# Patient Record
Sex: FEMALE | Race: ASIAN
[De-identification: names, ages, dates, MRNs, and addresses within clinical notes are randomized per-mention and may not be internally consistent; named-entity substitution may affect disease eponyms.]

---

## 2018-12-18 ENCOUNTER — HOSPITAL ENCOUNTER (INPATIENT)
Dept: HOSPITAL 72 - EMR | Age: 68
LOS: 3 days | Discharge: HOME | DRG: 640 | End: 2018-12-21
Payer: MEDICARE

## 2018-12-18 VITALS — HEIGHT: 61 IN | BODY MASS INDEX: 33.61 KG/M2 | WEIGHT: 178 LBS

## 2018-12-18 VITALS — SYSTOLIC BLOOD PRESSURE: 114 MMHG | DIASTOLIC BLOOD PRESSURE: 85 MMHG

## 2018-12-18 VITALS — DIASTOLIC BLOOD PRESSURE: 65 MMHG | SYSTOLIC BLOOD PRESSURE: 132 MMHG

## 2018-12-18 VITALS — DIASTOLIC BLOOD PRESSURE: 59 MMHG | SYSTOLIC BLOOD PRESSURE: 169 MMHG

## 2018-12-18 VITALS — DIASTOLIC BLOOD PRESSURE: 62 MMHG | SYSTOLIC BLOOD PRESSURE: 140 MMHG

## 2018-12-18 VITALS — SYSTOLIC BLOOD PRESSURE: 137 MMHG | DIASTOLIC BLOOD PRESSURE: 56 MMHG

## 2018-12-18 VITALS — DIASTOLIC BLOOD PRESSURE: 68 MMHG | SYSTOLIC BLOOD PRESSURE: 168 MMHG

## 2018-12-18 VITALS — DIASTOLIC BLOOD PRESSURE: 73 MMHG | SYSTOLIC BLOOD PRESSURE: 184 MMHG

## 2018-12-18 VITALS — SYSTOLIC BLOOD PRESSURE: 159 MMHG | DIASTOLIC BLOOD PRESSURE: 76 MMHG

## 2018-12-18 DIAGNOSIS — E11.649: ICD-10-CM

## 2018-12-18 DIAGNOSIS — F32.9: ICD-10-CM

## 2018-12-18 DIAGNOSIS — E87.1: Primary | ICD-10-CM

## 2018-12-18 DIAGNOSIS — Z79.84: ICD-10-CM

## 2018-12-18 DIAGNOSIS — I13.0: ICD-10-CM

## 2018-12-18 DIAGNOSIS — F41.9: ICD-10-CM

## 2018-12-18 DIAGNOSIS — E87.6: ICD-10-CM

## 2018-12-18 DIAGNOSIS — D61.818: ICD-10-CM

## 2018-12-18 DIAGNOSIS — G47.00: ICD-10-CM

## 2018-12-18 DIAGNOSIS — N18.9: ICD-10-CM

## 2018-12-18 DIAGNOSIS — I50.33: ICD-10-CM

## 2018-12-18 DIAGNOSIS — E87.8: ICD-10-CM

## 2018-12-18 DIAGNOSIS — D63.8: ICD-10-CM

## 2018-12-18 DIAGNOSIS — E11.21: ICD-10-CM

## 2018-12-18 DIAGNOSIS — E87.2: ICD-10-CM

## 2018-12-18 DIAGNOSIS — D69.6: ICD-10-CM

## 2018-12-18 LAB
ADD MANUAL DIFF: NO
ALBUMIN SERPL-MCNC: 2.1 G/DL (ref 3.4–5)
ALBUMIN SERPL-MCNC: 2.2 G/DL (ref 3.4–5)
ALBUMIN/GLOB SERPL: 0.6 {RATIO} (ref 1–2.7)
ALBUMIN/GLOB SERPL: 0.6 {RATIO} (ref 1–2.7)
ALP SERPL-CCNC: 104 U/L (ref 46–116)
ALP SERPL-CCNC: 99 U/L (ref 46–116)
ALT SERPL-CCNC: 25 U/L (ref 12–78)
ALT SERPL-CCNC: 29 U/L (ref 12–78)
ANION GAP SERPL CALC-SCNC: 9 MMOL/L (ref 5–15)
ANION GAP SERPL CALC-SCNC: 9 MMOL/L (ref 5–15)
APPEARANCE UR: CLEAR
APTT PPP: YELLOW S
AST SERPL-CCNC: 25 U/L (ref 15–37)
AST SERPL-CCNC: 25 U/L (ref 15–37)
BASOPHILS NFR BLD AUTO: 0.5 % (ref 0–2)
BILIRUB SERPL-MCNC: 0.1 MG/DL (ref 0.2–1)
BILIRUB SERPL-MCNC: 0.2 MG/DL (ref 0.2–1)
BUN SERPL-MCNC: 10 MG/DL (ref 7–18)
BUN SERPL-MCNC: 10 MG/DL (ref 7–18)
CALCIUM SERPL-MCNC: 6.9 MG/DL (ref 8.5–10.1)
CALCIUM SERPL-MCNC: 7.3 MG/DL (ref 8.5–10.1)
CHLORIDE SERPL-SCNC: 91 MMOL/L (ref 98–107)
CHLORIDE SERPL-SCNC: 93 MMOL/L (ref 98–107)
CK MB SERPL-MCNC: 1.5 NG/ML (ref 0–3.6)
CK SERPL-CCNC: 33 U/L (ref 26–308)
CO2 SERPL-SCNC: 19 MMOL/L (ref 21–32)
CO2 SERPL-SCNC: 21 MMOL/L (ref 21–32)
CREAT SERPL-MCNC: 0.8 MG/DL (ref 0.55–1.3)
CREAT SERPL-MCNC: 0.9 MG/DL (ref 0.55–1.3)
EOSINOPHIL NFR BLD AUTO: 0.3 % (ref 0–3)
ERYTHROCYTE [DISTWIDTH] IN BLOOD BY AUTOMATED COUNT: 10.9 % (ref 11.6–14.8)
GLOBULIN SER-MCNC: 3.7 G/DL
GLOBULIN SER-MCNC: 3.8 G/DL
GLUCOSE UR STRIP-MCNC: NEGATIVE MG/DL
HCT VFR BLD CALC: 28.6 % (ref 37–47)
HGB BLD-MCNC: 9.6 G/DL (ref 12–16)
KETONES UR QL STRIP: NEGATIVE
LEUKOCYTE ESTERASE UR QL STRIP: NEGATIVE
LYMPHOCYTES NFR BLD AUTO: 21.4 % (ref 20–45)
MCV RBC AUTO: 79 FL (ref 80–99)
MONOCYTES NFR BLD AUTO: 10.3 % (ref 1–10)
NEUTROPHILS NFR BLD AUTO: 67.5 % (ref 45–75)
NITRITE UR QL STRIP: NEGATIVE
PH UR STRIP: 5 [PH] (ref 4.5–8)
PLATELET # BLD: 145 K/UL (ref 150–450)
POTASSIUM SERPL-SCNC: 3.1 MMOL/L (ref 3.5–5.1)
POTASSIUM SERPL-SCNC: 4.3 MMOL/L (ref 3.5–5.1)
PROT UR QL STRIP: NEGATIVE
RBC # BLD AUTO: 3.6 M/UL (ref 4.2–5.4)
SODIUM SERPL-SCNC: 121 MMOL/L (ref 136–145)
SODIUM SERPL-SCNC: 121 MMOL/L (ref 136–145)
SP GR UR STRIP: 1.01 (ref 1–1.03)
UROBILINOGEN UR-MCNC: NORMAL MG/DL (ref 0–1)
WBC # BLD AUTO: 4 K/UL (ref 4.8–10.8)

## 2018-12-18 PROCEDURE — 82570 ASSAY OF URINE CREATININE: CPT

## 2018-12-18 PROCEDURE — 80053 COMPREHEN METABOLIC PANEL: CPT

## 2018-12-18 PROCEDURE — 82043 UR ALBUMIN QUANTITATIVE: CPT

## 2018-12-18 PROCEDURE — 81001 URINALYSIS AUTO W/SCOPE: CPT

## 2018-12-18 PROCEDURE — 93970 EXTREMITY STUDY: CPT

## 2018-12-18 PROCEDURE — 80048 BASIC METABOLIC PNL TOTAL CA: CPT

## 2018-12-18 PROCEDURE — 87324 CLOSTRIDIUM AG IA: CPT

## 2018-12-18 PROCEDURE — 82962 GLUCOSE BLOOD TEST: CPT

## 2018-12-18 PROCEDURE — 87517 HEPATITIS B DNA QUANT: CPT

## 2018-12-18 PROCEDURE — 86803 HEPATITIS C AB TEST: CPT

## 2018-12-18 PROCEDURE — 93306 TTE W/DOPPLER COMPLETE: CPT

## 2018-12-18 PROCEDURE — 85025 COMPLETE CBC W/AUTO DIFF WBC: CPT

## 2018-12-18 PROCEDURE — 96374 THER/PROPH/DIAG INJ IV PUSH: CPT

## 2018-12-18 PROCEDURE — 83880 ASSAY OF NATRIURETIC PEPTIDE: CPT

## 2018-12-18 PROCEDURE — 87040 BLOOD CULTURE FOR BACTERIA: CPT

## 2018-12-18 PROCEDURE — 99285 EMERGENCY DEPT VISIT HI MDM: CPT

## 2018-12-18 PROCEDURE — 84443 ASSAY THYROID STIM HORMONE: CPT

## 2018-12-18 PROCEDURE — 87340 HEPATITIS B SURFACE AG IA: CPT

## 2018-12-18 PROCEDURE — 82044 UR ALBUMIN SEMIQUANTITATIVE: CPT

## 2018-12-18 PROCEDURE — 84300 ASSAY OF URINE SODIUM: CPT

## 2018-12-18 PROCEDURE — 83605 ASSAY OF LACTIC ACID: CPT

## 2018-12-18 PROCEDURE — 81003 URINALYSIS AUTO W/O SCOPE: CPT

## 2018-12-18 PROCEDURE — 96376 TX/PRO/DX INJ SAME DRUG ADON: CPT

## 2018-12-18 PROCEDURE — 71045 X-RAY EXAM CHEST 1 VIEW: CPT

## 2018-12-18 PROCEDURE — 84484 ASSAY OF TROPONIN QUANT: CPT

## 2018-12-18 PROCEDURE — 93005 ELECTROCARDIOGRAM TRACING: CPT

## 2018-12-18 PROCEDURE — 82553 CREATINE MB FRACTION: CPT

## 2018-12-18 PROCEDURE — 96361 HYDRATE IV INFUSION ADD-ON: CPT

## 2018-12-18 PROCEDURE — 36415 COLL VENOUS BLD VENIPUNCTURE: CPT

## 2018-12-18 PROCEDURE — 82550 ASSAY OF CK (CPK): CPT

## 2018-12-18 PROCEDURE — 83935 ASSAY OF URINE OSMOLALITY: CPT

## 2018-12-18 PROCEDURE — 83735 ASSAY OF MAGNESIUM: CPT

## 2018-12-18 PROCEDURE — 82533 TOTAL CORTISOL: CPT

## 2018-12-18 PROCEDURE — 84550 ASSAY OF BLOOD/URIC ACID: CPT

## 2018-12-18 RX ADMIN — METOPROLOL SUCCINATE SCH MG: 25 TABLET, EXTENDED RELEASE ORAL at 11:27

## 2018-12-18 RX ADMIN — INSULIN ASPART SCH UNITS: 100 INJECTION, SOLUTION INTRAVENOUS; SUBCUTANEOUS at 16:30

## 2018-12-18 RX ADMIN — LOSARTAN POTASSIUM SCH MG: 50 TABLET, FILM COATED ORAL at 11:27

## 2018-12-18 RX ADMIN — INSULIN ASPART SCH UNITS: 100 INJECTION, SOLUTION INTRAVENOUS; SUBCUTANEOUS at 21:00

## 2018-12-18 RX ADMIN — INSULIN ASPART SCH UNITS: 100 INJECTION, SOLUTION INTRAVENOUS; SUBCUTANEOUS at 11:30

## 2018-12-18 NOTE — CARDIOLOGY REPORT
--------------- APPROVED REPORT --------------





EXAM: Two-dimensional and M-mode echocardiogram with Doppler and color 

Doppler.



INDICATION

Congestive Heart Failure 



M-Mode DIMENSIONS 

IVSd1.1 (0.7-1.1cm)Left Atrium (MM)2.2 (1.6-4.0cm)

LVDd5.0 (3.5-5.6cm)Aortic Root3.3 (2.0-3.7cm)

PWd1.7 (0.7-1.1cm)Aortic Cusp Exc.1.9 (1.5-2.0cm)

IVSs1.3 cm

LVDs3.2 (2.5-4.0cm)

PWs2.1 cm





Technically difficult study poor endocardial definition in apical views 

Normal left ventricular chamber size, hyperdynamic systolic function and wall 

motion.

Left ventricular ejection fraction estimated to be 70-75  %.

No evidence of  left ventricular hypertrophy.

No evidence of pericardial effusion. 

Moderate left atrial enlargement.

Right atrial size at upper limits of normal.

Right ventricular chamber size is within normal limits.

Focal aortic valve sclerosis with adequate cusp excursion.

Thickened mitral valve leaflets with normal excursion.

Mitral annulus and aortic root calcification.

Pulmonic valve not well visualized.

Normal tricuspid valve structure. 

IVC at normal size with physiologic collapse.



A  color flow and spectral Doppler study was performed and revealed:

Mild mitral regurgitation.

Mitral inflow indicates normal left ventricular diastolic function. 

Trace tricuspid regurgitation.

Tricuspid  systolic velocities suggests peak right ventricular systolic pressure of 18   

mmHg.

## 2018-12-18 NOTE — HISTORY AND PHYSICAL REPORT
DATE OF ADMISSION:  12/18/2018

REASON FOR ADMISSION:  Electrolyte abnormalities with hypoglycemia and

refractory diarrhea.



HISTORY OF PRESENT ILLNESS:  This is a pleasant 68-year-old [place] female

who was hospitalized several weeks ago with pneumonia at an outside

hospital and discharged home on medications that included antibiotics and

diuretics.  Over the past two weeks, she has had continued episodes of

diarrhea and has become increasingly weak and withdrawn.  She was slight

sluggish and complained of numbness and tingling last evening.  She was

noted to be hypoglycemic in the field and brought to the emergency room

where a glucose level of 30 prompted intravenous dextrose infusion.  The

patient has not had any nausea or vomiting.  She has had diarrhea.  She

has not had abdominal pain.  She has not had chest pain or shortness of

breath.  She notes minimal cough and that her pneumonia has resolved.



PAST MEDICAL HISTORY:

1. Hypertension.

2. Type 2 diabetes mellitus.

3. Recent pneumonia.



ALLERGIES:  None known.



MEDICATIONS:  Prior to admission reviewed and reconciled.



SOCIAL HISTORY:  Negative for smoking, alcohol, or substance abuse.



FAMILY HISTORY:  Noncontributory.



REVIEW OF SYSTEMS:  No fevers or chills.  No loss of vision or hearing.  No

history of retinopathy.  No history of abnormal blood clotting, recent

pneumonia as noted above.  No history of heart attack.  She does have high

blood pressure.  She does have fluid retention and takes a water pill

mainly furosemide.  She is unaware of any history of thyroid disorder.

She takes metformin and oral medications only for her diabetes.  There is

no history of seizure or stroke.  She does not know her cholesterol level.

She has had diarrhea for the last 1 to 2 weeks.  She describes it as

______ There is no known history of kidney disease.



PHYSICAL EXAMINATION:

GENERAL:  The patient is well-developed and well-nourished. She is in no

acute distress.

VITAL SIGNS:  Temperature 98.3, blood pressure 137/56, heart rate 75,

respiratory rate 16, and oxygen saturation 99% on room air.

HEENT:  Normocephalic and atraumatic.  Conjunctivae pink.  Sclerae are

anicteric.  Oropharynx clear.  Mucous membranes dry.

NECK:  Supple.  Jugular venous pressure normal.

LUNGS:  Clear.

BREASTS:  Without masses.

CARDIAC:  Regular rhythm and rate.  Normal S1 and S2 with a fourth heart

sound.

ABDOMEN:  Soft, mildly tender diffusely.  No guarding or rebound.

Normoactive bowel sounds.

EXTREMITIES:  No clubbing or cyanosis.  A 1+ bilateral lower extremity

pitting edema.  Distal pulses palpable.

NEUROLOGIC:  Nonfocal.



LABORATORY AND DIAGNOSTIC DATA:  Chest x-ray revealed left pleural

effusion, bilateral interstitial prominence, cardiomegaly.  EKG revealed

sinus rhythm, nonspecific ST-T wave changes.  Labs, sodium 121, potassium

3.1, chloride 93, bicarb 19, BUN 10, creatinine 0.9, calcium 6.9, albumin

2.1.  White count 4, hemoglobin 9.6, platelets 145,000, MCV 79.

Urinalysis with no active sediment.



IMPRESSION:

1. Medication-associated electrolyte disturbances including hypokalemia,

hyponatremia, hypochloremia, lower extremity edema likely due to venous

insufficiency and possibly nephrotic syndrome.

2. Metabolic acidosis.

3. Thrombocytopenia and leukopenia.

4. Diarrhea, rule out C difficile.

5. History of congestive heart failure.

6. Left pleural effusion.

7. Recent pneumonia.



PLAN:

1. Cardiac monitoring.

2. Saline hydration.

3. Potassium replacement.

4. Check magnesium.

5. Check echocardiogram.

6. Stool studies including occult blood and C difficile toxin.

7. Venous duplex scan of the lower extremities.

8. DVT prophylaxis.

9. Insulin coverage by sliding scale.

10. Hold oral hypoglycemic drugs as well as furosemide at this time.

11. Renal consultation.









  ______________________________________________

  Cosmo Durbin M.D.





DR:  Mario Alberto

D:  12/18/2018 21:19

T:  12/18/2018 22:05

JOB#:  [id number]

CC:

## 2018-12-18 NOTE — EMERGENCY ROOM REPORT
History of Present Illness


General


Chief Complaint:  Abnormal Labs


Source:  Patient





Present Illness


HPI


Patient was found to be hypoglycemic with a glucose level in the 30s patient 

was given dextrose and brought to the emergency room





Here the patient sluggish to respond reports that he feels numb and tingling 

diffusely


Denies any chest pain she feels weak denies any headache or visual changes 

denies any neck pain or photophobia


Patient reports taking metformin for diabetes and denies any insulin intake


Allergies:  


Coded Allergies:  


     No Known Allergies (Unverified , 12/18/18)





Patient History


Past Medical History:  see triage record


Pertinent Family History:  none


Reviewed Nursing Documentation:  PMH: Agreed; PSxH: Agreed





Nursing Documentation-PMH


Past Medical History:  No Stated History


Hx Hypertension:  Yes


Hx Diabetes:  Yes





Review of Systems


All Other Systems:  negative except mentioned in HPI





Physical Exam





Vital Signs








  Date Time  Temp Pulse Resp B/P (MAP) Pulse Ox O2 Delivery O2 Flow Rate FiO2


 


12/18/18 01:50      Room Air  


 


12/18/18 01:57 98.3 75 16 137/56 99   








Sp02 EP Interpretation:  reviewed, normal


General Appearance:  no apparent distress


Head:  normocephalic, atraumatic


Eyes:  bilateral eye PERRL, bilateral eye EOMI


ENT:  hearing grossly normal, normal pharynx


Neck:  supple


Respiratory:  chest non-tender, lungs clear


Cardiovascular #1:  regular rate, rhythm


Gastrointestinal:  non tender, soft


Musculoskeletal:  normal inspection


Neurologic:  alert, oriented x3, responsive


Skin:  normal color, no rash


Lymphatic:  no adenopathy





Medical Decision Making


Diagnostic Impression:  


 Primary Impression:  


 Hypoglycemia


 Additional Impression:  


 Hyponatremia


ER Course


Patient is a fairly complex patient with multiple differential to consideration 

including but not limited to cardiac cardiopulmonary and vascular emergencies


Patient's glucose continued to lower again requiring further acute intervention


Blood work reveals multiple abnormalities including anemia


Hyponatremia





Patient's x-ray also shows abnormal findings


Patient improved clinically


And requires inpatient care





Labs








Test


  12/18/18


01:55 12/18/18


03:20 12/18/18


11:05 12/19/18


01:18


 


White Blood Count


  4.0 K/UL


(4.8-10.8) 


  


  


 


 


Red Blood Count


  3.60 M/UL


(4.20-5.40) 


  


  


 


 


Hemoglobin


  9.6 G/DL


(12.0-16.0) 


  


  


 


 


Hematocrit


  28.6 %


(37.0-47.0) 


  


  


 


 


Mean Corpuscular Volume 79 FL (80-99)    


 


Mean Corpuscular Hemoglobin


  26.8 PG


(27.0-31.0) 


  


  


 


 


Mean Corpuscular Hemoglobin


Concent 33.7 G/DL


(32.0-36.0) 


  


  


 


 


Red Cell Distribution Width


  10.9 %


(11.6-14.8) 


  


  


 


 


Platelet Count


  145 K/UL


(150-450) 


  


  


 


 


Mean Platelet Volume


  6.5 FL


(6.5-10.1) 


  


  


 


 


Neutrophils (%) (Auto)


  67.5 %


(45.0-75.0) 


  


  


 


 


Lymphocytes (%) (Auto)


  21.4 %


(20.0-45.0) 


  


  


 


 


Monocytes (%) (Auto)


  10.3 %


(1.0-10.0) 


  


  


 


 


Eosinophils (%) (Auto)


  0.3 %


(0.0-3.0) 


  


  


 


 


Basophils (%) (Auto)


  0.5 %


(0.0-2.0) 


  


  


 


 


Sodium Level


  121 MMOL/L


(136-145) 


  121 MMOL/L


(136-145) 


 


 


Potassium Level


  3.1 MMOL/L


(3.5-5.1) 


  4.3 MMOL/L


(3.5-5.1) 


 


 


Chloride Level


  93 MMOL/L


() 


  91 MMOL/L


() 


 


 


Carbon Dioxide Level


  19 MMOL/L


(21-32) 


  21 MMOL/L


(21-32) 


 


 


Anion Gap


  9 mmol/L


(5-15) 


  9 mmol/L


(5-15) 


 


 


Blood Urea Nitrogen


  10 mg/dL


(7-18) 


  10 mg/dL


(7-18) 


 


 


Creatinine


  0.9 MG/DL


(0.55-1.30) 


  0.8 MG/DL


(0.55-1.30) 


 


 


Estimat Glomerular Filtration


Rate > 60 mL/min


(>60) 


  > 60 mL/min


(>60) 


 


 


Glucose Level


  161 MG/DL


() 


  111 MG/DL


() 


 


 


Lactic Acid Level


  1.50 mmol/L


(0.4-2.0) 


  


  


 


 


Calcium Level


  6.9 MG/DL


(8.5-10.1) 


  7.3 MG/DL


(8.5-10.1) 


 


 


Total Bilirubin


  0.1 MG/DL


(0.2-1.0) 


  0.2 MG/DL


(0.2-1.0) 


 


 


Aspartate Amino Transf


(AST/SGOT) 25 U/L (15-37) 


  


  25 U/L (15-37) 


  


 


 


Alanine Aminotransferase


(ALT/SGPT) 25 U/L (12-78) 


  


  29 U/L (12-78) 


  


 


 


Alkaline Phosphatase


  99 U/L


() 


  104 U/L


() 


 


 


Total Creatine Kinase


  33 U/L


() 


  


  


 


 


Creatine Kinase MB


  1.5 NG/ML


(0.0-3.6) 


  


  


 


 


Creatine Kinase MB Relative


Index 4.5 


  


  


  


 


 


Troponin I


  0.000 ng/mL


(0.000-0.056) 


  


  


 


 


Total Protein


  5.8 G/DL


(6.4-8.2) 


  6.0 G/DL


(6.4-8.2) 


 


 


Albumin


  2.1 G/DL


(3.4-5.0) 


  2.2 G/DL


(3.4-5.0) 


 


 


Globulin 3.7 g/dL   3.8 g/dL  


 


Albumin/Globulin Ratio 0.6 (1.0-2.7)   0.6 (1.0-2.7)  


 


Urine Color  Yellow   Pale yellow 


 


Urine Appearance  Clear   Clear 


 


Urine pH  5 (4.5-8.0)   6.5 (4.5-8.0) 


 


Urine Specific Gravity


  


  1.010


(1.005-1.035) 


  1.010


(1.005-1.035)


 


Urine Protein


  


  Negative


(NEGATIVE) 


  4+ (NEGATIVE) 


 


 


Urine Glucose (UA)


  


  Negative


(NEGATIVE) 


  Negative


(NEGATIVE)


 


Urine Ketones


  


  Negative


(NEGATIVE) 


  Negative


(NEGATIVE)


 


Urine Blood


  


  Negative


(NEGATIVE) 


  Negative


(NEGATIVE)


 


Urine Nitrite


  


  Negative


(NEGATIVE) 


  Negative


(NEGATIVE)


 


Urine Bilirubin


  


  Negative


(NEGATIVE) 


  Negative


(NEGATIVE)


 


Urine Ictotest


  


  Negative


(NEGATIVE) 


  


 


 


Urine Urobilinogen


  


  Normal MG/DL


(0.0-1.0) 


  Normal MG/DL


(0.0-1.0)


 


Urine Leukocyte Esterase


  


  Negative


(NEGATIVE) 


  Negative


(NEGATIVE)


 


Uric Acid


  


  


  5.8 MG/DL


(2.6-7.2) 


 


 


Magnesium Level


  


  


  1.8 MG/DL


(1.8-2.4) 


 


 


Thyroid Stimulating Hormone


(TSH) 


  


  1.007 uiU/mL


(0.358-3.740) 


 


 


Cortisol AM Sample   19.1 UG/DL  


 


Urine RBC


  


  


  


  0-2 /HPF (0 -


2)


 


Urine WBC    0 /HPF (0 - 2) 


 


Urine Squamous Epithelial


Cells 


  


  


  Few /LPF


(NONE/OCC)


 


Urine Bacteria


  


  


  


  Few /HPF


(NONE)


 


Urine Osmolality


  


  


  


  88 mOsm/kg


(429-449)


 


Urine Random Total Protein


  


  


  


  115 MG/DL (<


11.9)


 


Urine Random Sodium


  


  


  


  < 20 mmol/L


()


 


Urine Creatinine


  


  


  


  24.0 MG/DL


(30.0-125.0)


 


Test


  12/19/18


04:15 12/20/18


04:25 


  


 


 


Sodium Level


  128 MMOL/L


(136-145) 128 MMOL/L


(136-145) 


  


 


 


Potassium Level


  4.5 MMOL/L


(3.5-5.1) 4.5 MMOL/L


(3.5-5.1) 


  


 


 


Chloride Level


  100 MMOL/L


() 100 MMOL/L


() 


  


 


 


Carbon Dioxide Level


  20 MMOL/L


(21-32) 20 MMOL/L


(21-32) 


  


 


 


Anion Gap


  8 mmol/L


(5-15) 8 mmol/L


(5-15) 


  


 


 


Blood Urea Nitrogen


  9 mg/dL (7-18) 


  14 mg/dL


(7-18) 


  


 


 


Creatinine


  0.8 MG/DL


(0.55-1.30) 1.0 MG/DL


(0.55-1.30) 


  


 


 


Estimat Glomerular Filtration


Rate > 60 mL/min


(>60) 54.8 mL/min


(>60) 


  


 


 


Glucose Level


  104 MG/DL


() 196 MG/DL


() 


  


 


 


Calcium Level


  7.3 MG/DL


(8.5-10.1) 7.3 MG/DL


(8.5-10.1) 


  


 


 


Hepatitis B Surface Antigen


  Negative


(Negative) 


  


  


 


 


Hepatitis B Surface Antibody


  4.2 mIU/mL


(Immunity>9.9) 


  


  


 


 


Hepatitis C Antibody


  <0.1 s/co


ratio 


  


  


 


 


White Blood Count


  


  3.5 K/UL


(4.8-10.8) 


  


 


 


Red Blood Count


  


  3.29 M/UL


(4.20-5.40) 


  


 


 


Hemoglobin


  


  9.1 G/DL


(12.0-16.0) 


  


 


 


Hematocrit


  


  27.7 %


(37.0-47.0) 


  


 


 


Mean Corpuscular Volume  84 FL (80-99)   


 


Mean Corpuscular Hemoglobin


  


  27.7 PG


(27.0-31.0) 


  


 


 


Mean Corpuscular Hemoglobin


Concent 


  32.9 G/DL


(32.0-36.0) 


  


 


 


Red Cell Distribution Width


  


  11.3 %


(11.6-14.8) 


  


 


 


Platelet Count


  


  148 K/UL


(150-450) 


  


 


 


Mean Platelet Volume


  


  5.8 FL


(6.5-10.1) 


  


 


 


Neutrophils (%) (Auto)


  


  48.0 %


(45.0-75.0) 


  


 


 


Lymphocytes (%) (Auto)


  


  31.7 %


(20.0-45.0) 


  


 


 


Monocytes (%) (Auto)


  


  14.0 %


(1.0-10.0) 


  


 


 


Eosinophils (%) (Auto)


  


  3.8 %


(0.0-3.0) 


  


 


 


Basophils (%) (Auto)


  


  2.6 %


(0.0-2.0) 


  


 


 


Magnesium Level


  


  2.0 MG/DL


(1.8-2.4) 


  


 


 


Pro-B-Type Natriuretic Peptide


  


  565 pg/mL


(0-125) 


  


 








Rhythm Strip Diag. Results


EP Interpretation:  yes


Rate:  67


Rhythm:  NSR, no PVC's, no ectopy





Chest X-Ray Diagnostic Results


Chest X-Ray Diagnostic Results :  


   Chest X-Ray Ordered:  Yes


   Indication:  Chest Pain


   EP Interpretation:  Yes


   Interpretation:  no pneumothorax, other - No acute bony abnormality, 

bilateral congestion versus other


   Impression:  Other - Bilateral effusion


   Electronically Signed by:  Howard Maravilla DO





Last Vital Signs








  Date Time  Temp Pulse Resp B/P (MAP) Pulse Ox O2 Delivery O2 Flow Rate FiO2


 


12/18/18 01:57 98.3 75 16 137/56 99 Room Air  








Status:  improved


Disposition:  ADMITTED AS INPATIENT


Condition:  Serious











Howard Maravilla DO Dec 18, 2018 02:18

## 2018-12-18 NOTE — CARDIOLOGY REPORT
--------------- APPROVED REPORT --------------





EKG Measurement

Heart [id number]

DE [id number]

[id number]

[id number]

[id number]





Normal sinus rhythm

Normal ECG

## 2018-12-18 NOTE — DIAGNOSTIC IMAGING REPORT
Indication: Shortness of breath

 

Technique: One view of the chest

 

Comparison: none

 

Findings: There is bilateral perihilar interstitial prominence. There is evidence of

pleural fluid on the left. The heart is borderline enlarged.

 

Impression: Bilateral perihilar interstitial prominence, edema versus chronic

interstitial changes.

 

Left pleural effusion

 

Borderline cardiomegaly

## 2018-12-19 VITALS — DIASTOLIC BLOOD PRESSURE: 63 MMHG | SYSTOLIC BLOOD PRESSURE: 147 MMHG

## 2018-12-19 VITALS — SYSTOLIC BLOOD PRESSURE: 152 MMHG | DIASTOLIC BLOOD PRESSURE: 62 MMHG

## 2018-12-19 VITALS — DIASTOLIC BLOOD PRESSURE: 62 MMHG | SYSTOLIC BLOOD PRESSURE: 137 MMHG

## 2018-12-19 VITALS — DIASTOLIC BLOOD PRESSURE: 50 MMHG | SYSTOLIC BLOOD PRESSURE: 122 MMHG

## 2018-12-19 VITALS — DIASTOLIC BLOOD PRESSURE: 58 MMHG | SYSTOLIC BLOOD PRESSURE: 147 MMHG

## 2018-12-19 VITALS — SYSTOLIC BLOOD PRESSURE: 156 MMHG | DIASTOLIC BLOOD PRESSURE: 73 MMHG

## 2018-12-19 LAB
ANION GAP SERPL CALC-SCNC: 8 MMOL/L (ref 5–15)
APPEARANCE UR: CLEAR
APTT PPP: (no result) S
BUN SERPL-MCNC: 9 MG/DL (ref 7–18)
CALCIUM SERPL-MCNC: 7.3 MG/DL (ref 8.5–10.1)
CHLORIDE SERPL-SCNC: 100 MMOL/L (ref 98–107)
CO2 SERPL-SCNC: 20 MMOL/L (ref 21–32)
CREAT SERPL-MCNC: 0.8 MG/DL (ref 0.55–1.3)
GLUCOSE UR STRIP-MCNC: NEGATIVE MG/DL
KETONES UR QL STRIP: NEGATIVE
LEUKOCYTE ESTERASE UR QL STRIP: NEGATIVE
NITRITE UR QL STRIP: NEGATIVE
PH UR STRIP: 6.5 [PH] (ref 4.5–8)
POTASSIUM SERPL-SCNC: 4.5 MMOL/L (ref 3.5–5.1)
PROT UR QL STRIP: (no result)
SODIUM SERPL-SCNC: 128 MMOL/L (ref 136–145)
SP GR UR STRIP: 1.01 (ref 1–1.03)
UROBILINOGEN UR-MCNC: NORMAL MG/DL (ref 0–1)

## 2018-12-19 RX ADMIN — INSULIN ASPART SCH UNITS: 100 INJECTION, SOLUTION INTRAVENOUS; SUBCUTANEOUS at 06:44

## 2018-12-19 RX ADMIN — CLONIDINE HYDROCHLORIDE SCH MG: 0.2 TABLET ORAL at 20:34

## 2018-12-19 RX ADMIN — LOSARTAN POTASSIUM SCH MG: 50 TABLET, FILM COATED ORAL at 09:22

## 2018-12-19 RX ADMIN — HYDRALAZINE HYDROCHLORIDE SCH MG: 50 TABLET ORAL at 20:42

## 2018-12-19 RX ADMIN — ALPRAZOLAM PRN MG: 0.5 TABLET ORAL at 00:58

## 2018-12-19 RX ADMIN — CLONIDINE HYDROCHLORIDE SCH MG: 0.2 TABLET ORAL at 06:00

## 2018-12-19 RX ADMIN — INSULIN ASPART SCH UNITS: 100 INJECTION, SOLUTION INTRAVENOUS; SUBCUTANEOUS at 11:55

## 2018-12-19 RX ADMIN — INSULIN ASPART SCH UNITS: 100 INJECTION, SOLUTION INTRAVENOUS; SUBCUTANEOUS at 16:51

## 2018-12-19 RX ADMIN — HYDRALAZINE HYDROCHLORIDE SCH MG: 50 TABLET ORAL at 00:45

## 2018-12-19 RX ADMIN — HYDRALAZINE HYDROCHLORIDE SCH MG: 50 TABLET ORAL at 11:53

## 2018-12-19 RX ADMIN — INSULIN ASPART SCH UNITS: 100 INJECTION, SOLUTION INTRAVENOUS; SUBCUTANEOUS at 20:43

## 2018-12-19 RX ADMIN — METOPROLOL SUCCINATE SCH MG: 25 TABLET, EXTENDED RELEASE ORAL at 09:22

## 2018-12-19 RX ADMIN — HYDRALAZINE HYDROCHLORIDE SCH MG: 50 TABLET ORAL at 06:00

## 2018-12-19 RX ADMIN — CLONIDINE HYDROCHLORIDE SCH MG: 0.2 TABLET ORAL at 11:53

## 2018-12-19 RX ADMIN — CLONIDINE HYDROCHLORIDE SCH MG: 0.2 TABLET ORAL at 00:45

## 2018-12-19 RX ADMIN — FUROSEMIDE SCH MG: 40 TABLET ORAL at 09:21

## 2018-12-19 NOTE — CONSULTATION
DATE OF CONSULTATION:  12/18/2018

NEPHROLOGY CONSULTATION



CONSULTING PHYSICIAN:  Jacky Murillo M.D.



REFERRING PHYSICIAN:  Cosmo Durbin M.D.



REASON FOR CONSULTATION:  Hyponatremia.



HISTORY OF PRESENT ILLNESS:  The patient is a 68-year-old lady who is

admitted to the hospital with an episode of hypoglycemia and weakness.

She is found to be severely hyponatremic.  She drinks a moderate amount of

fluids, probably 2 L or more a day and she is not aware of prior episodes

of hyponatremia.  She has had difficulty controlling blood pressure.

Recently, her blood pressure was in the 170s at home.  She has also been

on Lasix recently for leg edema.  She is not aware of any CHF or renal

disease.



ALLERGIES:  None known.



SURGERIES:  Hysterectomy at age 28.



MEDICATIONS:  Home medications include clonidine, glipizide, losartan,

metformin, metoprolol, acarbose, hydralazine, losartan, and Lasix 40 mg

once a day.



SYSTEM REVIEW:

HEAD, EYES, EARS, NOSE, THROAT:  Vision and hearing is good.

ENDOCRINE:  Diabetes as above.  She is not aware of any thyroid disease.

PULMONARY:  No asthma, TB, or chronic cough.

CARDIAC:  No angina, MI, or palpitations.  Leg edema as above.

GASTROINTESTINAL:  No nausea, vomiting, or abdominal pain.

GENITOURINARY:  No dysuria or hematuria.  No known proteinuria.

NEUROLOGIC:  No CVA or seizures.



PHYSICAL EXAMINATION:

GENERAL:  The patient is an alert lady, in no acute distress.

VITAL SIGNS:  BMI 34, temperature 98.3 degrees, pulse 84, respirations 22,

blood pressure 184/76.

HEAD, EYES, EARS, NOSE, THROAT:  Sclerae are nonicteric.  Ocular motions

intact in all directions.  Oral mucosa moist.

NECK:  No adenopathy or thyroid enlargement.

LUNGS:  Clear.

HEART:  Regular rhythm.  No murmur.

ABDOMEN:  Soft without organomegaly or masses.

EXTREMITIES:  A 2+ edema.

NEUROLOGIC:  She is alert and oriented.  Cranial nerves are intact.



PERTINENT LABORATORY DATA:  White count of 4000, hemoglobin of 9.6, MCV of

79, and platelets 145,000.  Sodium 121, potassium 4.3, chloride 91, CO2

21, BUN 10, creatinine 0.8, and glucose 111.  Uric acid 5.8.  Albumin is

low at 2.2.  TSH 1.007.  Urinalysis with dipstick negative for protein.



IMPRESSION:

1. Hyponatremia, etiology is likely free water intake plus the use of

the Lasix, however, she may have an edematous disorder such as liver

disease or possibly nephrotic syndrome, which may promote added water

retention as well as sodium retention.

2. Hypoglycemia episode.

3. Leg edema from low albumin.

4. Uncontrolled blood pressure.



PLAN:  I will do further urine and liver studies to evaluate cause of her

edematous state.  She has had an echocardiogram done, I will defer to Dr. Durbin, but she does have an ejection fraction of 75%.  We will put her on

a fluid restriction.  I gave her a small amount of 3% of saline and I will

repeat her sodium.  Further studies are pending.









  ______________________________________________

  Jacky Murillo M.D.





DR:  GUIDO

D:  12/18/2018 19:12

T:  12/19/2018 01:19

JOB#:  [id number]

CC:

## 2018-12-19 NOTE — NEPHROLOGY PROGRESS NOTE
Assessment/Plan


Problem List:  


(1) Malignant hypertension (arteriolar nephrosclerosis)


(2) Edema


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


(4) Nephrotic syndrome


(5) Hyponatremia


(6) Hypoglycemia


Plan


fluid and Na restriction, dietary consult, losartan , increase hydralazine and 

clonidine





Subjective


Constitutional:  Reports: weakness


HEENT:  Reports: no symptoms


Genitourinary:  Reports: no symptoms


Neurologic/Psychiatric:  Reports: no symptoms





Objective


Objective





Last 24 Hour Vital Signs








  Date Time  Temp Pulse Resp B/P (MAP) Pulse Ox O2 Delivery O2 Flow Rate FiO2


 


12/19/18 12:00      Room Air  


 


12/19/18 12:00 98.5 73 21 152/62 (92) 98   


 


12/19/18 11:57  78      


 


12/19/18 11:53    152/62    


 


12/19/18 11:53    152/62    


 


12/19/18 09:22    147/58    


 


12/19/18 09:22  70  147/58    


 


12/19/18 08:00      Room Air  


 


12/19/18 08:00 98.9 70 20 147/58 (87) 97   


 


12/19/18 07:40  67      


 


12/19/18 06:00    129/47    


 


12/19/18 06:00    129/47    


 


12/19/18 04:00 98.8 72 20 137/62 (87) 97   


 


12/19/18 04:00  69      


 


12/19/18 04:00      Room Air  


 


12/19/18 00:45    147/67    


 


12/19/18 00:45    147/67    


 


12/19/18 00:00      Room Air  


 


12/19/18 00:00  75      


 


12/19/18 00:00 97.5 76 20 147/63 (91) 97   


 


12/18/18 21:00  84      


 


12/18/18 20:16    186/85    


 


12/18/18 20:16    186/85    


 


12/18/18 20:00 98.8 86 22 186/85 (118) 97   


 


12/18/18 20:00      Room Air  


 


12/18/18 17:23    168/68 (101)    

















Intake and Output  


 


 12/18/18 12/19/18





 19:00 07:00


 


Intake Total 1250 ml 50 ml


 


Output Total 1200 ml 800 ml


 


Balance 50 ml -750 ml


 


  


 


Intake Oral  50 ml


 


IV Total 1250 ml 


 


Output Urine Total 1200 ml 800 ml








Laboratory Tests


12/19/18 01:18: 


Urine Color Pale yellow, Urine Appearance Clear, Urine pH 6.5, Urine Specific 

Gravity 1.010, Urine Protein 4+H, Urine Glucose (UA) Negative, Urine Ketones 

Negative, Urine Blood Negative, Urine Nitrite Negative, Urine Bilirubin Negative

, Urine Urobilinogen Normal, Urine Leukocyte Esterase Negative, Urine RBC 0-2, 

Urine WBC 0, Urine Squamous Epithelial Cells Few, Urine Bacteria Few, Urine 

Osmolality 88L, Urine Random Creatinine [Pending], Urine Random Microalbumin [

Pending], Urine Random Total Protein 115H, Urine Random Sodium < 20L, Urine 

Creatinine 24.0L, Urine Microalbumin/Creatinine Ratio [Pending]


12/19/18 04:15: 


Sodium Level 128L, Potassium Level 4.5, Chloride Level 100, Carbon Dioxide 

Level 20L, Anion Gap 8, Blood Urea Nitrogen 9, Creatinine 0.8, Estimat 

Glomerular Filtration Rate > 60, Glucose Level 104, Calcium Level 7.3L, 

Hepatitis B Surface Antigen [Pending], Hepatitis B Surface Antibody [Pending], 

Hepatitis C Antibody [Pending]


Height (Feet):  5


Height (Inches):  1.00


Weight (Pounds):  178


General Appearance:  no apparent distress, alert


EENT:  normal ENT inspection


Neck:  normal alignment


Cardiovascular:  normal rate, regular rhythm


Respiratory/Chest:  lungs clear


Abdomen:  soft, no organomegaly


Extremities:  non-tender, moderate edema











Jacky Murillo MD Dec 19, 2018 16:16

## 2018-12-20 VITALS — SYSTOLIC BLOOD PRESSURE: 180 MMHG | DIASTOLIC BLOOD PRESSURE: 73 MMHG

## 2018-12-20 VITALS — DIASTOLIC BLOOD PRESSURE: 64 MMHG | SYSTOLIC BLOOD PRESSURE: 144 MMHG

## 2018-12-20 VITALS — DIASTOLIC BLOOD PRESSURE: 62 MMHG | SYSTOLIC BLOOD PRESSURE: 146 MMHG

## 2018-12-20 VITALS — SYSTOLIC BLOOD PRESSURE: 154 MMHG | DIASTOLIC BLOOD PRESSURE: 73 MMHG

## 2018-12-20 VITALS — SYSTOLIC BLOOD PRESSURE: 149 MMHG | DIASTOLIC BLOOD PRESSURE: 70 MMHG

## 2018-12-20 VITALS — DIASTOLIC BLOOD PRESSURE: 58 MMHG | SYSTOLIC BLOOD PRESSURE: 155 MMHG

## 2018-12-20 LAB
ADD MANUAL DIFF: NO
ANION GAP SERPL CALC-SCNC: 8 MMOL/L (ref 5–15)
BASOPHILS NFR BLD AUTO: 2.6 % (ref 0–2)
BUN SERPL-MCNC: 14 MG/DL (ref 7–18)
CALCIUM SERPL-MCNC: 7.3 MG/DL (ref 8.5–10.1)
CHLORIDE SERPL-SCNC: 100 MMOL/L (ref 98–107)
CO2 SERPL-SCNC: 20 MMOL/L (ref 21–32)
CREAT SERPL-MCNC: 1 MG/DL (ref 0.55–1.3)
EOSINOPHIL NFR BLD AUTO: 3.8 % (ref 0–3)
ERYTHROCYTE [DISTWIDTH] IN BLOOD BY AUTOMATED COUNT: 11.3 % (ref 11.6–14.8)
HCT VFR BLD CALC: 27.7 % (ref 37–47)
HGB BLD-MCNC: 9.1 G/DL (ref 12–16)
LYMPHOCYTES NFR BLD AUTO: 31.7 % (ref 20–45)
MCV RBC AUTO: 84 FL (ref 80–99)
MONOCYTES NFR BLD AUTO: 14 % (ref 1–10)
NEUTROPHILS NFR BLD AUTO: 48 % (ref 45–75)
PLATELET # BLD: 148 K/UL (ref 150–450)
POTASSIUM SERPL-SCNC: 4.5 MMOL/L (ref 3.5–5.1)
RBC # BLD AUTO: 3.29 M/UL (ref 4.2–5.4)
SODIUM SERPL-SCNC: 128 MMOL/L (ref 136–145)
WBC # BLD AUTO: 3.5 K/UL (ref 4.8–10.8)

## 2018-12-20 RX ADMIN — CLONIDINE HYDROCHLORIDE SCH MG: 0.2 TABLET ORAL at 08:31

## 2018-12-20 RX ADMIN — HYDRALAZINE HYDROCHLORIDE SCH MG: 50 TABLET ORAL at 08:32

## 2018-12-20 RX ADMIN — INSULIN ASPART SCH UNITS: 100 INJECTION, SOLUTION INTRAVENOUS; SUBCUTANEOUS at 11:45

## 2018-12-20 RX ADMIN — INSULIN ASPART SCH UNITS: 100 INJECTION, SOLUTION INTRAVENOUS; SUBCUTANEOUS at 06:15

## 2018-12-20 RX ADMIN — HYDRALAZINE HYDROCHLORIDE SCH MG: 50 TABLET ORAL at 09:00

## 2018-12-20 RX ADMIN — CLONIDINE HYDROCHLORIDE SCH MG: 0.2 TABLET ORAL at 21:13

## 2018-12-20 RX ADMIN — FUROSEMIDE SCH MG: 40 TABLET ORAL at 08:31

## 2018-12-20 RX ADMIN — ALPRAZOLAM PRN MG: 0.5 TABLET ORAL at 01:19

## 2018-12-20 RX ADMIN — LOSARTAN POTASSIUM SCH MG: 50 TABLET, FILM COATED ORAL at 08:32

## 2018-12-20 RX ADMIN — INSULIN ASPART SCH UNITS: 100 INJECTION, SOLUTION INTRAVENOUS; SUBCUTANEOUS at 16:30

## 2018-12-20 RX ADMIN — METOPROLOL SUCCINATE SCH MG: 25 TABLET, EXTENDED RELEASE ORAL at 08:33

## 2018-12-20 RX ADMIN — INSULIN ASPART SCH UNITS: 100 INJECTION, SOLUTION INTRAVENOUS; SUBCUTANEOUS at 20:52

## 2018-12-20 RX ADMIN — HYDRALAZINE HYDROCHLORIDE SCH MG: 50 TABLET ORAL at 20:52

## 2018-12-20 NOTE — PROGRESS NOTE
DATE:  12/19/2018

CARDIOLOGY AND INTERNAL MEDICINE PROGRESS NOTE



SUBJECTIVE:  The patient feels better.  She wants to go home soon.

Echocardiogram yesterday revealed normal ejection fraction and no

pulmonary hypertension or pericardial disease.  The patient has a good

appetite.



PHYSICAL EXAMINATION:

VITAL SIGNS:  Blood pressure 156/73, heart rate 75, respiratory rate 20.

Monitored rhythm, sinus.

NECK:  Supple.

LUNGS:  Clear.

CARDIAC:  Regular rhythm and rate.  Normal S1, S2 with a 1/6 systolic

murmur at apex.

ABDOMEN:  Soft, nontender.

EXTREMITIES:  1+ dependent edema.



LABORATORY DATA:  Sodium is now 128, potassium 4.5, bicarbonate 20, BUN 9,

creatinine 0.8.  White count 4, hemoglobin 9.6.



IMPRESSION:

1. Hypertensive heart disease.

2. Chronic lower extremity edema.

3. Diabetes mellitus.

4. Nephrotic syndrome.

5. Hyponatremia, improving.

6. Metabolic acidosis.

7. Pancytopenia.

8. Resolving diarrhea.



PLAN:

1. Follow up stool studies.

2. Repeat blood count.

3. Fluid restriction.

4. Recheck laboratory studies.

5. Maintenance diuretic.

6. Discharge planning.









  ______________________________________________

  Cosmo Durbin M.D. DR:  DAINA

D:  12/20/2018 00:21

T:  12/20/2018 00:40

JOB#:  [id number]

CC:

## 2018-12-20 NOTE — NEPHROLOGY PROGRESS NOTE
Assessment/Plan


Problem List:  


(1) Malignant hypertension (arteriolar nephrosclerosis)


(2) Edema


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


(4) Nephrotic syndrome


(5) Hyponatremia


(6) Hypoglycemia


Plan


fluid and Na restriction, dietary consult, losartan , increase hydralazine and 

clonidine discussed Na and fluid restrict





Subjective


Constitutional:  Reports: weakness


HEENT:  Reports: no symptoms


Genitourinary:  Reports: no symptoms


Neurologic/Psychiatric:  Reports: no symptoms





Objective


Objective





Last 24 Hour Vital Signs








  Date Time  Temp Pulse Resp B/P (MAP) Pulse Ox O2 Delivery O2 Flow Rate FiO2


 


12/20/18 08:33  77  154/73    


 


12/20/18 08:32    154/73    


 


12/20/18 08:32    154/73    


 


12/20/18 08:31    154/73    


 


12/20/18 08:00      Room Air  


 


12/20/18 08:00 97.7 80 20 154/73 (100) 97   


 


12/20/18 04:00 98.5 82 20 155/58 (90) 97   


 


12/20/18 04:00      Room Air  


 


12/20/18 03:20  82      


 


12/20/18 00:00 98.2 81 20 149/70 (96) 100   


 


12/20/18 00:00      Room Air  


 


12/19/18 23:27  92      


 


12/19/18 21:34  73      


 


12/19/18 20:42    156/73    


 


12/19/18 20:34    156/73    


 


12/19/18 20:00 98.6 75 20 156/73 (100) 100   


 


12/19/18 20:00      Room Air  


 


12/19/18 16:00      Room Air  


 


12/19/18 16:00 98.3 69 21 122/50 (74) 98   


 


12/19/18 15:20  73      


 


12/19/18 12:00      Room Air  


 


12/19/18 12:00 98.5 73 21 152/62 (92) 98   


 


12/19/18 11:57  78      


 


12/19/18 11:53    152/62    


 


12/19/18 11:53    152/62    


 


12/19/18 09:22    147/58    


 


12/19/18 09:22  70  147/58    

















Intake and Output  


 


 12/19/18 12/20/18





 19:00 07:00


 


Intake Total 100 ml 150 ml


 


Output Total  300 ml


 


Balance 100 ml -150 ml


 


  


 


Intake Oral 100 ml 150 ml


 


Output Urine Total  300 ml


 


# Bowel Movements  1








Laboratory Tests


12/20/18 04:25: 


White Blood Count 3.5L, Red Blood Count 3.29L, Hemoglobin 9.1L, Hematocrit 27.7L

, Mean Corpuscular Volume 84, Mean Corpuscular Hemoglobin 27.7, Mean 

Corpuscular Hemoglobin Concent 32.9, Red Cell Distribution Width 11.3L, 

Platelet Count 148L, Mean Platelet Volume 5.8L, Neutrophils (%) (Auto) 48.0, 

Lymphocytes (%) (Auto) 31.7, Monocytes (%) (Auto) 14.0H, Eosinophils (%) (Auto) 

3.8H, Basophils (%) (Auto) 2.6H, Sodium Level 128L, Potassium Level 4.5, 

Chloride Level 100, Carbon Dioxide Level 20L, Anion Gap 8, Blood Urea Nitrogen 

14, Creatinine 1.0, Estimat Glomerular Filtration Rate 54.8, Glucose Level 196H

, Calcium Level 7.3L, Magnesium Level 2.0, Pro-B-Type Natriuretic Peptide 565H


Height (Feet):  5


Height (Inches):  1.00


Weight (Pounds):  178


General Appearance:  no apparent distress, alert


EENT:  normal ENT inspection


Neck:  normal alignment


Cardiovascular:  normal rate, regular rhythm


Respiratory/Chest:  lungs clear


Abdomen:  non tender, soft


Extremities:  moderate edema


Neurologic:  CNs II-XII grossly normal











Jacky Murillo MD Dec 20, 2018 08:53

## 2018-12-20 NOTE — CONSULTATION
History of Present Illness


General


Chief Complaint:  Abnormal Labs





Present Illness


HPI


68-year-old [place] female who has hx of anxiety and depression. the pt came 

with abnormal labs. the pt is complaining of anxiety and insomnia. the pt takes 

xanax and stated that she has still insomnia. the pt stated that she takes 

other sleeping meds however is not helpful the pt is anxious most of the day 

and has episodes of depression


Allergies:  


Coded Allergies:  


     No Known Allergies (Unverified , 12/18/18)





Medication History


Scheduled


Losartan Potassium (Losartan Potassium), 0 ORAL DAILY, (Reported)


Metoprolol Succinate* (Metoprolol Succinate*), 0 ORAL DAILY, (Reported)





Miscellaneous Medications


Clonidine Hcl/Pf (Clonidine 1000 Mcg/10 Ml Vial), 0 EP, (Reported)


Glipizide (Glipizide), 0 MC, (Reported)


Metformin Hcl (Metformin Hcl), 0 MC, (Reported)





Patient History


Limited by:  medical condition


History Provided By:  Patient, Medical Record, PMD


Healthcare decision maker





Resuscitation status


Full Code


Advanced Directive on File








Past Medical/Surgical History


Past Medical/Surgical History:  


(1) Abnormal laboratory test result


(2) Nephrotic syndrome


(3) Edema


(4) Hypoglycemia


(5) Hyponatremia


(6) Malignant hypertension (arteriolar nephrosclerosis)


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





Review of Systems


Psychiatric:  Reports: prior hx, anxiety, depressed feelings, emotional problems





Physical Exam


General Appearance:  alert, moderate distress, obese


Neurologic:  oriented x 3, responsive, depressed affect





Last 24 Hour Vital Signs








  Date Time  Temp Pulse Resp B/P (MAP) Pulse Ox O2 Delivery O2 Flow Rate FiO2


 


12/20/18 10:14    154/77    


 


12/20/18 08:33  77  154/73    


 


12/20/18 08:32    154/73    


 


12/20/18 08:32    154/73    


 


12/20/18 08:31    154/73    


 


12/20/18 08:00      Room Air  


 


12/20/18 08:00 97.7 80 20 154/73 (100) 97   


 


12/20/18 07:36  80      


 


12/20/18 04:00 98.5 82 20 155/58 (90) 97   


 


12/20/18 04:00      Room Air  


 


12/20/18 03:20  82      


 


12/20/18 00:00 98.2 81 20 149/70 (96) 100   


 


12/20/18 00:00      Room Air  


 


12/19/18 23:27  92      


 


12/19/18 21:34  73      


 


12/19/18 20:42    156/73    


 


12/19/18 20:34    156/73    


 


12/19/18 20:00 98.6 75 20 156/73 (100) 100   


 


12/19/18 20:00      Room Air  


 


12/19/18 16:00      Room Air  


 


12/19/18 16:00 98.3 69 21 122/50 (74) 98   


 


12/19/18 15:20  73      


 


12/19/18 12:00      Room Air  


 


12/19/18 12:00 98.5 73 21 152/62 (92) 98   


 


12/19/18 11:57  78      


 


12/19/18 11:53    152/62    


 


12/19/18 11:53    152/62    

















Intake and Output  


 


 12/19/18 12/20/18





 19:00 07:00


 


Intake Total 100 ml 150 ml


 


Output Total  300 ml


 


Balance 100 ml -150 ml


 


  


 


Intake Oral 100 ml 150 ml


 


Output Urine Total  300 ml


 


# Bowel Movements  1











Laboratory Tests








Test


  12/20/18


04:25


 


White Blood Count


  3.5 K/UL


(4.8-10.8)  L


 


Red Blood Count


  3.29 M/UL


(4.20-5.40)  L


 


Hemoglobin


  9.1 G/DL


(12.0-16.0)  L


 


Hematocrit


  27.7 %


(37.0-47.0)  L


 


Mean Corpuscular Volume 84 FL (80-99)  


 


Mean Corpuscular Hemoglobin


  27.7 PG


(27.0-31.0)


 


Mean Corpuscular Hemoglobin


Concent 32.9 G/DL


(32.0-36.0)


 


Red Cell Distribution Width


  11.3 %


(11.6-14.8)  L


 


Platelet Count


  148 K/UL


(150-450)  L


 


Mean Platelet Volume


  5.8 FL


(6.5-10.1)  L


 


Neutrophils (%) (Auto)


  48.0 %


(45.0-75.0)


 


Lymphocytes (%) (Auto)


  31.7 %


(20.0-45.0)


 


Monocytes (%) (Auto)


  14.0 %


(1.0-10.0)  H


 


Eosinophils (%) (Auto)


  3.8 %


(0.0-3.0)  H


 


Basophils (%) (Auto)


  2.6 %


(0.0-2.0)  H


 


Sodium Level


  128 MMOL/L


(136-145)  L


 


Potassium Level


  4.5 MMOL/L


(3.5-5.1)


 


Chloride Level


  100 MMOL/L


()


 


Carbon Dioxide Level


  20 MMOL/L


(21-32)  L


 


Anion Gap


  8 mmol/L


(5-15)


 


Blood Urea Nitrogen


  14 mg/dL


(7-18)


 


Creatinine


  1.0 MG/DL


(0.55-1.30)


 


Estimat Glomerular Filtration


Rate 54.8 mL/min


(>60)


 


Glucose Level


  196 MG/DL


()  H


 


Calcium Level


  7.3 MG/DL


(8.5-10.1)  L


 


Magnesium Level


  2.0 MG/DL


(1.8-2.4)


 


Pro-B-Type Natriuretic Peptide


  565 pg/mL


(0-125)  H








Height (Feet):  5


Height (Inches):  1.00


Weight (Pounds):  178


Medications





Current Medications








 Medications


  (Trade)  Dose


 Ordered  Sig/Samantha


 Route


 PRN Reason  Start Time


 Stop Time Status Last Admin


Dose Admin


 


 Alprazolam


  (Xanax)  1 mg  DAILYPRN  PRN


 ORAL


 ANXIETY  12/20/18 10:30


 12/27/18 10:29   


 


 


 Clonidine HCl


  (Catapres Tab)  0.1 mg  Q4H  PRN


 ORAL


 SBP above 160  12/18/18 10:15


 1/17/19 10:14  12/18/18 16:00


 


 


 Clonidine HCl


  (Catapres tab)  0.6 mg  Q12H


 ORAL


   12/19/18 21:00


 1/18/19 20:59  12/20/18 08:31


 


 


 Dextrose


  (Dextrose 50%)  25 ml  Q30M  PRN


 IV


 Hypoglycemia  12/18/18 10:15


 1/17/19 10:14   


 


 


 Dextrose


  (Dextrose 50%)  50 ml  Q30M  PRN


 IV


 Hypoglycemia  12/18/18 10:15


 1/17/19 10:14   


 


 


 Hydralazine HCl


  (Apresoline)  150 mg  Q12HR


 ORAL


   12/20/18 09:00


 1/19/19 08:59   


 


 


 Insulin Aspart


  (NovoLOG)    BEFORE MEALS AND  HS


 SUBQ


   12/18/18 11:30


 1/17/19 11:29  12/20/18 06:15


 


 


 Losartan Potassium


  (Cozaar)  100 mg  DAILY


 ORAL


   12/18/18 10:15


 1/17/19 10:14  12/20/18 08:32


 


 


 Metoprolol


 Succinate


  (Toprol XL)  25 mg  DAILY


 ORAL


   12/18/18 10:15


 1/17/19 10:14  12/20/18 08:33


 


 


 Mirtazapine


  (Remeron)  7.5 mg  BEDTIME


 ORAL


   12/20/18 21:00


 1/19/19 20:59   


 











Assessment/Plan


Problem List:  


(1) Anxiety disorder


ICD Codes:  F41.9 - Anxiety disorder, unspecified


SNOMED:  985871158


(2) mdd


Assessment/Plan


Xanax 1mg q daily prn


Remeron 15mg qhs


provided ro/Christophe Joe MD Dec 20, 2018 11:20

## 2018-12-21 VITALS — SYSTOLIC BLOOD PRESSURE: 158 MMHG | DIASTOLIC BLOOD PRESSURE: 70 MMHG

## 2018-12-21 VITALS — SYSTOLIC BLOOD PRESSURE: 164 MMHG | DIASTOLIC BLOOD PRESSURE: 63 MMHG

## 2018-12-21 VITALS — SYSTOLIC BLOOD PRESSURE: 168 MMHG | DIASTOLIC BLOOD PRESSURE: 70 MMHG

## 2018-12-21 LAB
ANION GAP SERPL CALC-SCNC: 7 MMOL/L (ref 5–15)
BUN SERPL-MCNC: 16 MG/DL (ref 7–18)
CALCIUM SERPL-MCNC: 8.1 MG/DL (ref 8.5–10.1)
CHLORIDE SERPL-SCNC: 101 MMOL/L (ref 98–107)
CO2 SERPL-SCNC: 22 MMOL/L (ref 21–32)
CREAT SERPL-MCNC: 1.1 MG/DL (ref 0.55–1.3)
POTASSIUM SERPL-SCNC: 4.5 MMOL/L (ref 3.5–5.1)
SODIUM SERPL-SCNC: 130 MMOL/L (ref 136–145)

## 2018-12-21 RX ADMIN — INSULIN ASPART SCH UNITS: 100 INJECTION, SOLUTION INTRAVENOUS; SUBCUTANEOUS at 06:38

## 2018-12-21 RX ADMIN — CLONIDINE HYDROCHLORIDE SCH MG: 0.2 TABLET ORAL at 09:07

## 2018-12-21 RX ADMIN — METOPROLOL SUCCINATE SCH MG: 25 TABLET, EXTENDED RELEASE ORAL at 09:06

## 2018-12-21 RX ADMIN — LOSARTAN POTASSIUM SCH MG: 50 TABLET, FILM COATED ORAL at 09:04

## 2018-12-21 RX ADMIN — HYDRALAZINE HYDROCHLORIDE SCH MG: 50 TABLET ORAL at 09:03

## 2018-12-21 RX ADMIN — INSULIN ASPART SCH UNITS: 100 INJECTION, SOLUTION INTRAVENOUS; SUBCUTANEOUS at 11:14

## 2018-12-22 NOTE — GENERAL PROGRESS NOTE
Assessment/Plan


Problem List:  


(1) Anxiety disorder


ICD Codes:  F41.9 - Anxiety disorder, unspecified


SNOMED:  244230507


(2) mdd


Status:  stable


Assessment/Plan


Xanax 1mg q daily prn


Remeron 15mg qhs


provided ro/st





Subjective


Date patient seen:  Dec 21, 2018


Neurologic/Psychiatric:  Reports: anxiety, depressed


Allergies:  


Coded Allergies:  


     No Known Allergies (Unverified , 12/18/18)





Objective





Last 24 Hour Vital Signs








  Date Time  Temp Pulse Resp B/P (MAP) Pulse Ox O2 Delivery O2 Flow Rate FiO2


 


12/21/18 09:07    164/63    


 


12/21/18 09:06  68  164/63    


 


12/21/18 09:04    164/63    


 


12/21/18 09:03    164/63    


 


12/21/18 08:09  73      


 


12/21/18 08:00 97.5 68 20 164/63 (96) 98   


 


12/21/18 08:00      Room Air  


 


12/21/18 04:00      Room Air  


 


12/21/18 04:00 97.4 97 21 168/70 (102) 99   


 


12/21/18 04:00  70      


 


12/21/18 03:59    168/70    








Laboratory Tests


12/21/18 04:15: 


Sodium Level 130L, Potassium Level 4.5, Chloride Level 101, Carbon Dioxide 

Level 22, Anion Gap 7, Blood Urea Nitrogen 16, Creatinine 1.1, Estimat 

Glomerular Filtration Rate 49.1, Glucose Level 155H, Calcium Level 8.1L


Height (Feet):  5


Height (Inches):  1.00


Weight (Pounds):  177


General Appearance:  no apparent distress, alert


Neurologic:  oriented x 3, responsive, normal mood/affect











Christophe Dia MD Dec 22, 2018 01:24

## 2018-12-23 NOTE — DISCHARGE SUMMARY
Discharge Summary


Discharge Summary


_


DATE OF ADMISSION: 12/18/2018





DATE OF DISCHARGE: 12/21/2018








DISCHARGED BY: Dr. Durbin





REASON FOR ADMISSION: 


68 years old female with past medical history of hypertension, type 2 diabetes 

mellitus, recent pneumonia, who was recently hospitalized in another facility 

with pneumonia and discharged on medications, including antibiotic and diuretic

, reported continued episodes of diarrhea and as a result became increasingly 

weak and withdrawn.  


Patient was complaining of numbness and tingling  in her extremities the 

evening prior to presentation.  


Upon evaluation in the field she was noted to be hypoglycemic with blood sugar 

in 30th   and received intravenous dextrose infusion.  


No nausea, no vomiting ,no abdominal pain. 


Patient denied chest pain  and  shortness of breath.  


She had minimal cough.


Upon evaluation vital signs were stable.  


Laboratory workup revealed WBC 4.0, hemoglobin 9.6 hematocrit 28.6.  Platelet 

count 145.


Sodium 121, potassium 3.1,chloride 93 and CO2 -19.  


BUN 10 ,creatinine 0.9 .


Glucose 161  (after administration of dextrose ).


Albumin 2.1 .


Troponin negative. 


Chest x-ray revealed bilateral reticular interstitial prominence, edema versus 

chronic interstitial changes.  Left pleural effusion, borderline cardiomegaly.  


Patient was admitted with electrolyte disturbances likely medication associated 

including hypokalemia, hyponatremia, hypochloremia, lower extremity edema, 

likely due to venous insufficiency and possibly nephrotic syndrome, leukopenia, 

thrombocytopenia, diarrhea, rule out C. difficile, history of congestive heart 

failure, history of recent pneumonia, metabolic acidosis.





CONSULTANTS:


nephrologist Dr. Murillo


psychiatrist 


 


[facility] COURSE: 


Patient admitted to telemetry floor.  


Patient initially started on the IV hydration with saline.  


Renal consult was requested. 


Potassium was replaced.  Magnesium was stable, 1.8. 


Echocardiogram revealed ejection fraction of 70-75%.  No wall motion 

abnormality.  No evidence of left ventricular hypertrophy.  No evidence of 

pericardial effusion.  Right ventricular pressure of 18.


Stool for C. difficile was negative.  


Blood cultures were negative.  


Venous duplex bilateral lower extremity  revealed no evidence of acute DVT.   


Patient started on DVT prophylaxis 


Oral hypoglycemics and diuretic were on hold. 


Sliding scale of insulin was on on board as needed only.


 





Nephrologist seen, evaluated and  and closely followed.  


Repeated urine revealed +4 protein and random total protein 115.


Patient was placed on fluid  and sodium restriction.


Patient  undergone infusion  with 3% of sodium chloride.  


Per nephrologist patient had nephrotic syndrome and nephropathy due to diabetes 

.  


Sodium was slowly improving , prior to discharge 130. Potassium stable 4.5.  


Renal parameters stable.


Sodium and fluid restriction were discussed with patient.





Blood pressure was uncontrolled.  


Patient was on multiply antihypertensive regimen to keep blood pressure under 

control.  


Antihypertensive regimen was titrated to optimize blood pressure control.  


Dietary recommendations  implemented  in  plan of care.    





Psychiatrist seen and evaluated patient , and diagnosed patient with anxiety 

disorder and major depressive disorder.  


Patient started on Remeron at night time.  


Reality orientation  and supportive therapy provided.  


Xanax was on board as needed only





Hemoglobin and hematocrit were closely monitored with goal to keep hemoglobin 

above 7.  


Hemoglobin remained at baseline.  Platelet count up to 148, WBC down to 3.5.  


Closely monitor counts as outpatient. 


Stool for occult blood will be done as outpatient


Patient restarted on diuretic with close monitoring of volumes and  cardiorenal 

parameters.   


Blood sugar stable. 





Patient stabilized and was ready for discharge home. 








FINAL DIAGNOSES: 


Hyponatremia,  improving


Nephrotic syndrome


Type 2 diabetes mellitus 


Hypoglycemia 


Acute on chronic diastolic congestive heart failure


Malignant hypertension( arteriolar nephrosclerosis)


Nephropathy secondary to diabetes


Anemia of  chronic disease


Mild pancytopenia


Anxiety disorder


Major depressive disorder





DISCHARGE MEDICATIONS:


List of medication was sent with patient.





DISCHARGE INSTRUCTIONS:


Patient was discharged home, follow-up with a primary care provider in 1 week.








I have been assigned to dictate discharge summary for this account. 


I was not involved in the patient's management.











Sahra Romano NP Dec 23, 2018 10:43

## 2018-12-26 NOTE — DIAGNOSTIC IMAGING REPORT
--------------- APPROVED REPORT --------------





CPT Code: 76743



Present Symptoms

Lower Extremity Pain:  Bilateral 





BILATERAL: Imaging reveals a patent deep venous system bilaterally. There is no evidence 

of thrombus within the femoral, popliteal or tibial segments. The greater saphenous veins 

are also within normal limits. Doppler indicates normal spontaneous flow within these 

segments.